# Patient Record
Sex: MALE | Race: WHITE | NOT HISPANIC OR LATINO | URBAN - METROPOLITAN AREA
[De-identification: names, ages, dates, MRNs, and addresses within clinical notes are randomized per-mention and may not be internally consistent; named-entity substitution may affect disease eponyms.]

---

## 2020-11-20 ENCOUNTER — TRANSCRIBE ORDERS (OUTPATIENT)
Dept: ADMINISTRATIVE | Facility: HOSPITAL | Age: 65
End: 2020-11-20

## 2020-11-20 ENCOUNTER — HOSPITAL ENCOUNTER (OUTPATIENT)
Dept: MRI IMAGING | Facility: HOSPITAL | Age: 65
Discharge: HOME | End: 2020-11-20
Admitting: FAMILY MEDICINE

## 2020-11-20 DIAGNOSIS — R27.8 INCOORDINATION OF EXTREMITY: ICD-10-CM

## 2020-11-20 DIAGNOSIS — R27.8 INCOORDINATION OF EXTREMITY: Primary | ICD-10-CM

## 2020-11-20 PROCEDURE — 82565 ASSAY OF CREATININE: CPT

## 2020-11-20 PROCEDURE — 70553 MRI BRAIN STEM W/O & W/DYE: CPT

## 2020-11-20 PROCEDURE — 0 GADOBENATE DIMEGLUMINE 529 MG/ML SOLUTION: Performed by: FAMILY MEDICINE

## 2020-11-20 PROCEDURE — A9577 INJ MULTIHANCE: HCPCS | Performed by: FAMILY MEDICINE

## 2020-11-20 RX ADMIN — GADOBENATE DIMEGLUMINE 20 ML: 529 INJECTION, SOLUTION INTRAVENOUS at 20:28

## 2020-11-27 LAB — CREAT BLDA-MCNC: 1 MG/DL (ref 0.6–1.3)

## 2024-11-13 ENCOUNTER — HOSPITAL ENCOUNTER (EMERGENCY)
Facility: HOSPITAL | Age: 69
Discharge: HOME | End: 2024-11-13
Attending: EMERGENCY MEDICINE | Admitting: EMERGENCY MEDICINE
Payer: MEDICARE

## 2024-11-13 ENCOUNTER — APPOINTMENT (OUTPATIENT)
Dept: MRI IMAGING | Facility: HOSPITAL | Age: 69
End: 2024-11-13
Payer: MEDICARE

## 2024-11-13 VITALS
OXYGEN SATURATION: 94 % | HEART RATE: 68 BPM | WEIGHT: 260 LBS | SYSTOLIC BLOOD PRESSURE: 155 MMHG | RESPIRATION RATE: 16 BRPM | DIASTOLIC BLOOD PRESSURE: 92 MMHG | HEIGHT: 70 IN | BODY MASS INDEX: 37.22 KG/M2 | TEMPERATURE: 97.8 F

## 2024-11-13 DIAGNOSIS — R90.89 ABNORMAL BRAIN MRI: Primary | ICD-10-CM

## 2024-11-13 DIAGNOSIS — G51.0 BELL'S PALSY: ICD-10-CM

## 2024-11-13 LAB
ALBUMIN SERPL-MCNC: 4 G/DL (ref 3.5–5.2)
ALBUMIN/GLOB SERPL: 1.4 G/DL
ALP SERPL-CCNC: 47 U/L (ref 39–117)
ALT SERPL W P-5'-P-CCNC: 16 U/L (ref 1–41)
ANION GAP SERPL CALCULATED.3IONS-SCNC: 11 MMOL/L (ref 5–15)
AST SERPL-CCNC: 24 U/L (ref 1–40)
BASOPHILS # BLD AUTO: 0.02 10*3/MM3 (ref 0–0.2)
BASOPHILS NFR BLD AUTO: 0.5 % (ref 0–1.5)
BILIRUB SERPL-MCNC: 0.5 MG/DL (ref 0–1.2)
BUN SERPL-MCNC: 9 MG/DL (ref 8–23)
BUN/CREAT SERPL: 10.3 (ref 7–25)
CALCIUM SPEC-SCNC: 9.5 MG/DL (ref 8.6–10.5)
CHLORIDE SERPL-SCNC: 101 MMOL/L (ref 98–107)
CO2 SERPL-SCNC: 24 MMOL/L (ref 22–29)
CREAT SERPL-MCNC: 0.87 MG/DL (ref 0.76–1.27)
DEPRECATED RDW RBC AUTO: 35.9 FL (ref 37–54)
EGFRCR SERPLBLD CKD-EPI 2021: 92.2 ML/MIN/1.73
EOSINOPHIL # BLD AUTO: 0.11 10*3/MM3 (ref 0–0.4)
EOSINOPHIL NFR BLD AUTO: 3 % (ref 0.3–6.2)
ERYTHROCYTE [DISTWIDTH] IN BLOOD BY AUTOMATED COUNT: 11.4 % (ref 12.3–15.4)
GLOBULIN UR ELPH-MCNC: 2.9 GM/DL
GLUCOSE SERPL-MCNC: 112 MG/DL (ref 65–99)
HCT VFR BLD AUTO: 42 % (ref 37.5–51)
HGB BLD-MCNC: 15 G/DL (ref 13–17.7)
IMM GRANULOCYTES # BLD AUTO: 0 10*3/MM3 (ref 0–0.05)
IMM GRANULOCYTES NFR BLD AUTO: 0 % (ref 0–0.5)
LYMPHOCYTES # BLD AUTO: 1.47 10*3/MM3 (ref 0.7–3.1)
LYMPHOCYTES NFR BLD AUTO: 40.1 % (ref 19.6–45.3)
MCH RBC QN AUTO: 30.7 PG (ref 26.6–33)
MCHC RBC AUTO-ENTMCNC: 35.7 G/DL (ref 31.5–35.7)
MCV RBC AUTO: 85.9 FL (ref 79–97)
MONOCYTES # BLD AUTO: 0.39 10*3/MM3 (ref 0.1–0.9)
MONOCYTES NFR BLD AUTO: 10.6 % (ref 5–12)
NEUTROPHILS NFR BLD AUTO: 1.68 10*3/MM3 (ref 1.7–7)
NEUTROPHILS NFR BLD AUTO: 45.8 % (ref 42.7–76)
NRBC BLD AUTO-RTO: 0 /100 WBC (ref 0–0.2)
PLATELET # BLD AUTO: 182 10*3/MM3 (ref 140–450)
PMV BLD AUTO: 9.6 FL (ref 6–12)
POTASSIUM SERPL-SCNC: 4 MMOL/L (ref 3.5–5.2)
PROT SERPL-MCNC: 6.9 G/DL (ref 6–8.5)
RBC # BLD AUTO: 4.89 10*6/MM3 (ref 4.14–5.8)
SODIUM SERPL-SCNC: 136 MMOL/L (ref 136–145)
WBC NRBC COR # BLD AUTO: 3.67 10*3/MM3 (ref 3.4–10.8)

## 2024-11-13 PROCEDURE — 86618 LYME DISEASE ANTIBODY: CPT | Performed by: EMERGENCY MEDICINE

## 2024-11-13 PROCEDURE — 99284 EMERGENCY DEPT VISIT MOD MDM: CPT

## 2024-11-13 PROCEDURE — 36415 COLL VENOUS BLD VENIPUNCTURE: CPT

## 2024-11-13 PROCEDURE — 80053 COMPREHEN METABOLIC PANEL: CPT | Performed by: EMERGENCY MEDICINE

## 2024-11-13 PROCEDURE — 85025 COMPLETE CBC W/AUTO DIFF WBC: CPT | Performed by: EMERGENCY MEDICINE

## 2024-11-13 PROCEDURE — 70551 MRI BRAIN STEM W/O DYE: CPT

## 2024-11-13 RX ORDER — ACYCLOVIR 400 MG/1
400 TABLET ORAL
Qty: 35 TABLET | Refills: 0 | Status: SHIPPED | OUTPATIENT
Start: 2024-11-13 | End: 2024-11-20

## 2024-11-13 RX ORDER — LISINOPRIL 30 MG/1
1 TABLET ORAL DAILY
COMMUNITY
Start: 2024-10-24

## 2024-11-13 RX ORDER — METHYLPREDNISOLONE 4 MG/1
TABLET ORAL
Qty: 21 TABLET | Refills: 0 | Status: SHIPPED | OUTPATIENT
Start: 2024-11-13

## 2024-11-13 RX ORDER — ATORVASTATIN CALCIUM 40 MG/1
40 TABLET, FILM COATED ORAL
COMMUNITY
Start: 2024-10-24

## 2024-11-13 NOTE — PLAN OF CARE
Received a call from Dr. Black this afternoon about a patient in the ED with abnormal MRI results concerning for possible pituitary mass. Imaging reviewed with Dr. Fajardo. He is going to need a repeat MRI of the brain with and without contrast, with pituitary protocol. I have placed this order. He can follow up as an outpatient with his new MRI with Dr. Fajardo. Case discussed with Dr. Black.     Mirela Meyers PA-C

## 2024-11-13 NOTE — ED PROVIDER NOTES
Subjective   History of Present Illness  This patient is a very nice 69-year-old gentleman who appears somewhat younger than his stated age.  He comes in today complaining of left facial numbness that started yesterday approximate 1230.  About 6:30 PM yesterday, roughly 15 hours ago, he noticed some droopiness on the left side of his face.  He tells me he had a stroke approximately 3 years ago and became concerned but decided to come in this morning for evaluation.  He tells me that approximate 1 week ago he had a viral illness and felt sick for several days.  He is also had a headache for couple of days.  He denies any history of dysrhythmia.  He is unsure of exactly what the causation was for his prior stroke 3 years ago.  He tells me takes blood pressure medication and a baby aspirin.  No Eliquis or Xarelto.  Patient is pleasant, articulate, and oriented x 4.  He tells me of some numbness over the left side of his face and has had difficulty closing his left eye securely.    Patient denies any runny nose currently tells me that his viral illness from last week is now gone.  He denies any ringing in the ears.    Past medical history  CVA, recent viral illness, hypertension    Family history  Negative for CVA      Review of Systems   Constitutional: Negative.  Negative for chills, fatigue, fever and unexpected weight change.   HENT:  Negative for dental problem, ear pain, hearing loss and sinus pressure.    Eyes:  Negative for pain and visual disturbance.   Respiratory:  Negative for chest tightness and shortness of breath.    Cardiovascular:  Negative for chest pain, palpitations and leg swelling.   Gastrointestinal:  Negative for blood in stool, diarrhea, nausea and vomiting.   Genitourinary:  Negative for difficulty urinating, dysuria, frequency, hematuria and urgency.   Musculoskeletal:  Negative for myalgias, neck pain and neck stiffness.   Neurological:  Positive for facial asymmetry and numbness. Negative  for seizures, syncope, speech difficulty, light-headedness and headaches.   Psychiatric/Behavioral:  Negative for confusion.    All other systems reviewed and are negative.      Past Medical History:   Diagnosis Date    Hyperlipidemia     Hypertension     Stroke        No Known Allergies    Past Surgical History:   Procedure Laterality Date    ANKLE SURGERY Left        History reviewed. No pertinent family history.    Social History     Socioeconomic History    Marital status:            Objective   Physical Exam  Vitals and nursing note reviewed.   Constitutional:       General: He is not in acute distress.     Appearance: He is well-developed. He is not toxic-appearing.   HENT:      Head: Normocephalic and atraumatic.      Jaw: No trismus.      Right Ear: Tympanic membrane, ear canal and external ear normal.      Left Ear: Tympanic membrane, ear canal and external ear normal.      Nose: Nose normal.      Mouth/Throat:      Mouth: Mucous membranes are moist. Mucous membranes are not dry. No oral lesions.      Dentition: No dental abscesses.      Pharynx: Oropharynx is clear. No posterior oropharyngeal erythema or uvula swelling.      Tonsils: No tonsillar exudate or tonsillar abscesses.   Eyes:      General:         Right eye: No discharge.         Left eye: No discharge.      Extraocular Movements: Extraocular movements intact.      Conjunctiva/sclera: Conjunctivae normal.      Right eye: Right conjunctiva is not injected.      Left eye: Left conjunctiva is not injected.      Pupils: Pupils are equal, round, and reactive to light.   Neck:      Vascular: No JVD.      Trachea: No tracheal tenderness.   Cardiovascular:      Rate and Rhythm: Normal rate and regular rhythm.      Heart sounds: Normal heart sounds.      No friction rub. No gallop.   Pulmonary:      Effort: Pulmonary effort is normal.      Breath sounds: Normal breath sounds. No wheezing or rales.   Chest:      Chest wall: No tenderness.    Abdominal:      General: Bowel sounds are normal. There is no distension.      Palpations: Abdomen is soft. Abdomen is not rigid. There is no mass or pulsatile mass.      Tenderness: There is no abdominal tenderness. There is no guarding or rebound. Negative signs include McBurney's sign.      Comments: No signs of acute abdomen.  No pain at McBurney's point.  No pulsatile abdominal mass.   Musculoskeletal:         General: No tenderness or deformity. Normal range of motion.      Cervical back: Normal range of motion and neck supple. No rigidity. Normal range of motion.   Lymphadenopathy:      Cervical: No cervical adenopathy.   Skin:     General: Skin is warm and dry.      Capillary Refill: Capillary refill takes less than 2 seconds.      Findings: No erythema or rash.      Comments: No diaphoresis, lesions, nevi, petechia, purpura   Neurological:      Mental Status: He is alert and oriented to person, place, and time.      Cranial Nerves: No cranial nerve deficit.      Sensory: No sensory deficit.      Motor: No tremor or abnormal muscle tone.      Comments: 5/5 strength bilaterally with flexion and extension of fingers, wrist, elbows, knees and hips as well as plantar and dorsiflexion of the foot.  Patient with positive facial numbness and will decrease sensory abilities of the left side of the face.  Positive facial droop.  Inability to close the left eye securely and patient unable to raise the left eyebrow.  Clear weakness of the entire distribution of the left seventh facial nerve   Psychiatric:         Attention and Perception: He is attentive.         Speech: Speech normal.         Behavior: Behavior normal.         Thought Content: Thought content normal.         Judgment: Judgment normal.         Procedures           ED Course  ED Course as of 11/16/24 1307   Wed Nov 13, 2024   0909 I had a long conversation with the patient at the bedside.  We discussed the differential diagnosis and medical decision  making.  I have ordered an MRI of the brain and labs including Lyme disease study given his history of hunting.  We have talked about peripheral versus central cranial nerve VII lesions.  Based on the fact that the patient cannot close his eye very well on the left side and has no movement of the left side of forehead, I do favor Bell's palsy.  Will get MRI of the brain, lab studies, and discussed the case with the stroke team. [CHANEL]   0910 The patient's symptoms started yesterday.  His last known well was yesterday mid afternoon.  He is out of any acute stroke window and is not a TNKase candidate.  I have talked to the patient about this.  He is in good spirits.  His family is coming here to be with him in the emergency department.  Final impression and plan following completion of workup. [CHANEL]   1022 Lyme study has been collected and is pending.  CBC and CMP back and essentially unremarkable.  Glucose is elevated at 112 otherwise no abnormalities on the CMP in need of intervention/correction.  CBC looks great.      I reexamined the patient personally as he was going to MRI.  He is feeling well.  I discussed the case with the patient's wife and we had a very nice conversation.  MRI is currently pending and if unremarkable as anticipated, we will get the patient discharged home on appropriate medications.  It sounds like the family's son was diagnosed with Bell's palsy and they are very well aware of this condition and the management of it, which is reassuring. [CHANEL]   1139 MRI has been completed and I have reviewed images independently I do not see any evidence of acute abnormality including but not limited to bleed.    Official radiologic read has been cut/pasted below and I have discussed with the stroke neuroteam.    Impression:     1. Limited study without restricted diffusion to indicate an acute infarct.  2. Mild to moderate chronic microvascular ischemic changes.  3. Enlargement of the pituitary gland with  central T2 hyperintensity suspicious for pituitary tumor. A follow-up MRI of the sella without and with contrast is recommended for further assessment.           Electronically Signed: Shelly Nino MD    11/13/2024 10:40 AM EST    Workstation ID: [id number]      Exam Ended: 11/13/24 10:28 EST     [CHANEL]   1139 I let the patient know that these findings were likely innocuous and unrelated to a serious etiology but that I would discussed the case with neurosurgery.  I have paged Dr. Nick Fajardo as of approximately 11 30 a.m. Eastern time and I am currently awaiting a callback. [CHANEL]   1140 At the most, I do believe the patient will likely have outpatient follow-up with neurosurgery for repeat imaging.  I have talked the patient and his wife about these findings and the plan.  Will discuss with neurosurgery and get the patient moving towards discharge shortly thereafter.  I plan to treat the patient with steroids and antivirals for the Bell's palsy.  Will add abnormal MRI to the patient's list and have him follow-up with PCP in the next week as we have already discussed.  Patient and family very appreciative for care and patient is without question or complaint will be discharged home shortly. [CHANEL]   1152 Attached to the patient about the conversation with neurosurgery and the plan for outpatient follow-up in the next 3 to 4 weeks.  Outpatient MRI to be ordered by neurosurgery per my conversation with them.  I have added these notes to the patient's discharge summary and patient will be discharged home accordingly. [CHANEL]      ED Course User Index  [CHANEL] Rico Black MD   No results found for this or any previous visit (from the past 24 hours).  Note: In addition to lab results from this visit, the labs listed above may include labs taken at another facility or during a different encounter within the last 24 hours. Please correlate lab times with ED admission and discharge times for further clarification of the services  performed during this visit.    MRI Brain Without Contrast   Final Result   Impression:      1. Limited study without restricted diffusion to indicate an acute infarct.   2. Mild to moderate chronic microvascular ischemic changes.   3. Enlargement of the pituitary gland with central T2 hyperintensity suspicious for pituitary tumor. A follow-up MRI of the sella without and with contrast is recommended for further assessment.            Electronically Signed: Shelly Nino MD     11/13/2024 10:40 AM EST     Workstation ID: [id number]      MRI Brain With & Without Contrast    (Results Pending)     Vitals:    11/13/24 0950 11/13/24 1100 11/13/24 1200 11/13/24 1228   BP:  157/95 149/86 155/92   BP Location:       Patient Position:       Pulse: 66 65 71 68   Resp:       Temp:       TempSrc:       SpO2: 94% 94% 91% 94%   Weight:       Height:         Medications - No data to display  ECG/EMG Results (last 24 hours)       ** No results found for the last 24 hours. **          No orders to display                        No data recorded                             Medical Decision Making  Given the motor weakness on the left side the face, I do favor Bell's palsy.  That being said, must consider stroke given his history and the story.  Would want to consider Lyme disease secondary to the patient's history of hunting.  I have talked to the patient about steroids and antivirals.  Will get MRI, labs and make disposition and planning thereafter.    Amount and/or Complexity of Data Reviewed  Labs: ordered. Decision-making details documented in ED Course.  Radiology: ordered. Decision-making details documented in ED Course.        Final diagnoses:   Bell's palsy   Abnormal brain MRI       ED Disposition  ED Disposition       ED Disposition   Discharge    Condition   Stable    Comment   --               Beth Hall MD  [address] [address]  [phone]    In 1 week      Saqib Fajardo,  MD  [address]  [address]  [place] [id number]  [phone]    In 3 weeks           Medication List        New Prescriptions      acyclovir 400 MG tablet  Commonly known as: ZOVIRAX  Take 1 tablet by mouth 5 (Five) Times a Day for 7 days. Take no more than 5 doses a day.     methylPREDNISolone 4 MG dose pack  Commonly known as: MEDROL  Take as directed on package instructions.               Where to Get Your Medications        These medications were sent to [id number] - [place], [place] - [address]  AT [facility] [address] [facility] - [phone] [facility] [phone] FX  [address]  [address], [facility] [address]      Phone: [phone]   acyclovir 400 MG tablet  methylPREDNISolone 4 MG dose pack            Rico Black MD  11/16/24 0426

## 2024-11-13 NOTE — DISCHARGE INSTRUCTIONS
Utilize Lacri-Lube and tape your eye shut as we discussed.    I have prescribed you both steroids and antivirals as we discussed.  Take as prescribed.    Call to make an appointment with your PCP in the next week.    As we discussed, call to make an appointment with neurosurgery, Dr. Adalid Fajardo, in the next 3 weeks.  They are placing an order for an outpatient MRI.  When you call to confirm appointment, make sure you know when and where to complete MRI.    Return to the emergency department immediately for new or change concerns.    Please review the medications you are supposed to be taking according to prior physician recommendations. I have not changed your home medications during this visit. If your discharge instructions indicate that I have changed your home medications, this is not the case, and you should disregard. If you have any questions about the medication you should be taking at home, please call your physician.

## 2024-11-14 LAB — B BURGDOR IGG+IGM SER QL IA: NEGATIVE

## 2024-11-27 ENCOUNTER — HOSPITAL ENCOUNTER (OUTPATIENT)
Dept: MRI IMAGING | Facility: HOSPITAL | Age: 69
Discharge: HOME | End: 2024-11-27
Payer: MEDICARE

## 2024-11-27 DIAGNOSIS — R90.89 ABNORMAL BRAIN MRI: ICD-10-CM

## 2024-11-27 PROCEDURE — 25510000002 GADOBENATE DIMEGLUMINE 529 MG/ML SOLUTION

## 2024-11-27 PROCEDURE — A9577 INJ MULTIHANCE: HCPCS

## 2024-11-27 PROCEDURE — 70553 MRI BRAIN STEM W/O & W/DYE: CPT

## 2024-11-27 RX ADMIN — GADOBENATE DIMEGLUMINE 19 ML: 529 INJECTION, SOLUTION INTRAVENOUS at 12:12

## 2024-12-02 ENCOUNTER — OFFICE VISIT (OUTPATIENT)
Dept: NEUROSURGERY | Facility: CLINIC | Age: 69
End: 2024-12-02
Payer: MEDICARE

## 2024-12-02 VITALS — BODY MASS INDEX: 35.02 KG/M2 | TEMPERATURE: 98.6 F | WEIGHT: 244.6 LBS | HEIGHT: 70 IN

## 2024-12-02 DIAGNOSIS — E23.6 PITUITARY MASS: Primary | ICD-10-CM

## 2024-12-02 PROCEDURE — 99203 OFFICE O/P NEW LOW 30 MIN: CPT | Performed by: NEUROLOGICAL SURGERY

## 2024-12-02 RX ORDER — TRAZODONE HYDROCHLORIDE 50 MG/1
50 TABLET, FILM COATED ORAL
COMMUNITY
Start: 2024-11-19

## 2024-12-02 RX ORDER — ASPIRIN 81 MG/1
1 TABLET ORAL DAILY
COMMUNITY

## 2024-12-02 NOTE — PROGRESS NOTES
"Subjective     Chief Complaint: Abnormal brain MRI    Patient ID: Dagoberto Fraga is a 69 y.o. male is here today for follow-up.    History of Present Illness    This is a 69-year-old man who I was consulted on while he was in the emergency department several weeks ago.  He came to the hospital for Bell's palsy and underwent some imaging, specifically of his brain.  There was a suggestion of a possible pituitary abnormality which represented a significant change in comparison to a study he had back in 2020.  At the time, he did not have any clinical signs or symptoms of apoplexy, so he was discharged home with follow-up with me scheduled.  He recently underwent a MRI of his brain, with and without contrast, pituitary protocol, and he presents today to discuss the results of the study.    He denies any headaches, nausea, vomiting, strokelike symptoms, or seizures.  He denies any nipple discharge, or changes in his ring, hat, or shoe size.  He denies heat or cold intolerance.    The following portions of the patient's history were reviewed and updated as appropriate: allergies, current medications, past family history, past medical history, past social history, past surgical history and problem list.    Family history: History reviewed. No pertinent family history.    Social history:   Social History     Socioeconomic History    Marital status:    Tobacco Use    Smoking status: Never    Smokeless tobacco: Never   Vaping Use    Vaping status: Never Used   Substance and Sexual Activity    Alcohol use: Not Currently    Drug use: Never    Sexual activity: Defer       Review of Systems    Objective   Temperature 98.6 °F (37 °C), temperature source Temporal, height 177.8 cm (70\"), weight 111 kg (244 lb 9.6 oz).  Body mass index is 35.1 kg/m².    Physical Exam  Vitals reviewed.   Constitutional:       General: He is not in acute distress.     Appearance: He is well-developed. He is not diaphoretic.   HENT:      Head: " Normocephalic and atraumatic.   Pulmonary:      Effort: Pulmonary effort is normal.   Skin:     General: Skin is warm and dry.   Neurological:      Mental Status: He is alert and oriented to person, place, and time.      Comments: Speech production is fluent with no paraphasic errors.  He is pleasant, conversant, and appropriate.  He is a good historian.  Visual fields are full to finger waving.  No pronator drift.  Finger-nose testing is performed without bradykinesia or dysmetria.   Psychiatric:         Behavior: Behavior normal.         Assessment & Plan     Independent Review of Radiographic Studies:      Available for my review is a MRI of the brain with and without contrast that was performed on 11/27/2024.  There is what appears to be a small focus of hemorrhage in the pituitary gland which was not present on his scan from 2020.  There is no enhancement of the pituitary gland noted.    Medical Decision Making:      The aforementioned findings are concerning for pituitary apoplexy and an underlying adenoma cannot be excluded.  My recommendation would be for repeat MRI of the brain with and without contrast, pituitary protocol in 3 months.  I reviewed the signs and symptoms of optic chiasm compression and pituitary apoplexy with the patient and his wife.  I will order visual field baselines, as well as pituitary labs.  I will follow-up with him after his next MRI.    Diagnoses and all orders for this visit:    1. Pituitary mass (Primary)        Return in about 3 months (around 3/2/2025).           This document signed by MARISABEL Fajardo MD December 2, 2024 14:49 EST

## 2025-01-07 ENCOUNTER — LAB (OUTPATIENT)
Dept: LAB | Facility: HOSPITAL | Age: 70
End: 2025-01-07
Payer: MEDICARE

## 2025-01-07 DIAGNOSIS — E23.6 PITUITARY MASS: ICD-10-CM

## 2025-01-07 LAB
FSH SERPL-ACNC: 4.45 MIU/ML
LH SERPL-ACNC: 3.64 MIU/ML
PROLACTIN SERPL-MCNC: 6.76 NG/ML (ref 4.04–15.2)
T4 FREE SERPL-MCNC: 0.79 NG/DL (ref 0.92–1.68)
TSH SERPL DL<=0.05 MIU/L-ACNC: 3.86 UIU/ML (ref 0.27–4.2)

## 2025-01-07 PROCEDURE — 84443 ASSAY THYROID STIM HORMONE: CPT

## 2025-01-07 PROCEDURE — 84146 ASSAY OF PROLACTIN: CPT

## 2025-01-07 PROCEDURE — 84305 ASSAY OF SOMATOMEDIN: CPT

## 2025-01-07 PROCEDURE — 82024 ASSAY OF ACTH: CPT

## 2025-01-07 PROCEDURE — 36415 COLL VENOUS BLD VENIPUNCTURE: CPT

## 2025-01-07 PROCEDURE — 83002 ASSAY OF GONADOTROPIN (LH): CPT

## 2025-01-07 PROCEDURE — 83001 ASSAY OF GONADOTROPIN (FSH): CPT

## 2025-01-07 PROCEDURE — 84439 ASSAY OF FREE THYROXINE: CPT

## 2025-01-08 LAB — ACTH PLAS-MCNC: 24.1 PG/ML (ref 7.2–63.3)

## 2025-01-10 LAB — IGF-I SERPL-MCNC: 72 NG/ML (ref 53–222)

## 2025-03-05 ENCOUNTER — HOSPITAL ENCOUNTER (OUTPATIENT)
Dept: MRI IMAGING | Facility: HOSPITAL | Age: 70
Discharge: HOME | End: 2025-03-05
Admitting: NEUROLOGICAL SURGERY
Payer: MEDICARE

## 2025-03-05 DIAGNOSIS — E23.6 PITUITARY MASS: ICD-10-CM

## 2025-03-05 PROCEDURE — A9577 INJ MULTIHANCE: HCPCS | Performed by: NEUROLOGICAL SURGERY

## 2025-03-05 PROCEDURE — 25510000002 GADOBENATE DIMEGLUMINE 529 MG/ML SOLUTION: Performed by: NEUROLOGICAL SURGERY

## 2025-03-05 PROCEDURE — 70553 MRI BRAIN STEM W/O & W/DYE: CPT

## 2025-03-05 RX ADMIN — GADOBENATE DIMEGLUMINE 20 ML: 529 INJECTION, SOLUTION INTRAVENOUS at 10:44

## 2025-03-07 ENCOUNTER — OFFICE VISIT (OUTPATIENT)
Dept: NEUROSURGERY | Facility: CLINIC | Age: 70
End: 2025-03-07
Payer: MEDICARE

## 2025-03-07 VITALS — TEMPERATURE: 98.2 F | BODY MASS INDEX: 34.22 KG/M2 | HEIGHT: 70 IN | WEIGHT: 239 LBS

## 2025-03-07 DIAGNOSIS — E23.0 PANHYPOPITUITARISM: Primary | ICD-10-CM

## 2025-03-07 DIAGNOSIS — E23.6 PITUITARY APOPLEXY: ICD-10-CM

## 2025-03-07 PROCEDURE — 99213 OFFICE O/P EST LOW 20 MIN: CPT | Performed by: NEUROLOGICAL SURGERY

## 2025-03-07 NOTE — PROGRESS NOTES
Subjective     Chief Complaint: Pituitary apoplexy    Patient ID: Dagoberto Fraga is a 69 y.o. male is here today for follow-up.    History of Present Illness    This is a 69-year-old man who I saw about 3 months ago for presumed pituitary apoplexy.  He was actually relatively asymptomatic at that time with no visual changes.  I ordered a surveillance/follow-up MRI and he presents today to discuss the results of this study.    He denies any additional headaches or visual changes.  He does endorse intermittent hot flashes which is a relatively new problem for him.    The following portions of the patient's history were reviewed and updated as appropriate: allergies, current medications, past family history, past medical history, past social history, past surgical history and problem list.    Family history: History reviewed. No pertinent family history.    Social history:   Social History     Socioeconomic History    Marital status:    Tobacco Use    Smoking status: Former     Current packs/day: 0.00     Average packs/day: 0.5 packs/day for 31.4 years (15.7 ttl pk-yrs)     Types: Cigarettes     Start date: 1/15/1970     Quit date: 2001     Years since quittin.8     Passive exposure: Past    Smokeless tobacco: Never   Vaping Use    Vaping status: Never Used   Substance and Sexual Activity    Alcohol use: Not Currently    Drug use: Never    Sexual activity: Not Currently     Partners: Female     Birth control/protection: Other       Review of Systems   Constitutional:  Negative for activity change, appetite change, chills, diaphoresis, fatigue, fever and unexpected weight change.   HENT:  Negative for congestion, dental problem, drooling, ear discharge, ear pain, facial swelling, hearing loss, mouth sores, nosebleeds, postnasal drip, rhinorrhea, sinus pressure, sinus pain, sneezing, sore throat, tinnitus, trouble swallowing and voice change.    Eyes:  Negative for photophobia, pain, discharge,  "redness, itching and visual disturbance.   Respiratory:  Negative for apnea, cough, choking, chest tightness, shortness of breath, wheezing and stridor.    Cardiovascular:  Negative for chest pain, palpitations and leg swelling.   Gastrointestinal:  Negative for abdominal distention, abdominal pain, anal bleeding, blood in stool, constipation, diarrhea, nausea, rectal pain and vomiting.   Endocrine: Negative for cold intolerance, heat intolerance, polydipsia, polyphagia and polyuria.   Genitourinary:  Negative for decreased urine volume, difficulty urinating, dysuria, enuresis, flank pain, frequency, genital sores, hematuria and urgency.   Musculoskeletal:  Negative for arthralgias, back pain, gait problem, joint swelling, myalgias, neck pain and neck stiffness.   Skin:  Negative for color change, pallor, rash and wound.   Allergic/Immunologic: Negative for environmental allergies, food allergies and immunocompromised state.   Neurological:  Negative for dizziness, tremors, seizures, syncope, facial asymmetry, speech difficulty, weakness, light-headedness, numbness and headaches.   Hematological:  Negative for adenopathy. Does not bruise/bleed easily.   Psychiatric/Behavioral:  Negative for agitation, behavioral problems, confusion, decreased concentration, dysphoric mood, hallucinations, self-injury, sleep disturbance and suicidal ideas. The patient is not nervous/anxious and is not hyperactive.        Objective   Temperature 98.2 °F (36.8 °C), temperature source Temporal, height 177.8 cm (70\"), weight 108 kg (239 lb).  Body mass index is 34.29 kg/m².    Physical Exam  Vitals reviewed.   Constitutional:       General: He is not in acute distress.     Appearance: He is well-developed. He is not diaphoretic.   HENT:      Head: Normocephalic and atraumatic.   Pulmonary:      Effort: Pulmonary effort is normal.   Skin:     General: Skin is warm and dry.   Neurological:      Mental Status: He is alert and oriented to " person, place, and time.   Psychiatric:         Behavior: Behavior normal.         Assessment & Plan     Independent Review of Radiographic Studies:      Available for my review is a MRI of the brain with and without contrast, pituitary protocol which was performed on March 5, 2025.  A comparison study from 11/27/2024 is also available for my review.  The previously visualized hemorrhage within the pituitary gland is resolved.  There is a thin rim of nonenhancing soft tissue along the floor of the sella with minimal normal enhancing pituitary gland remaining.    Medical Decision Making:      This is a 69-year-old man with resolved pituitary apoplexy.  I made a referral to endocrinology.  I reviewed the signs and symptoms of chiasmatic compression with him.  His visual fields were normal.  I am worried that he has panhypopituitary is him.  I will follow-up with a repeat MRI of the brain with and without contrast, pituitary protocol in 1 year and I will follow-up with him at that time.    Diagnoses and all orders for this visit:    1. Panhypopituitarism (Primary)    2. Pituitary apoplexy        No follow-ups on file.           This document signed by MARISABEL Fajardo MD March 7, 2025 10:43 EST

## 2025-05-12 ENCOUNTER — OFFICE VISIT (OUTPATIENT)
Age: 70
End: 2025-05-12
Payer: MEDICARE

## 2025-05-12 VITALS
HEIGHT: 70 IN | SYSTOLIC BLOOD PRESSURE: 128 MMHG | WEIGHT: 241 LBS | OXYGEN SATURATION: 98 % | DIASTOLIC BLOOD PRESSURE: 74 MMHG | BODY MASS INDEX: 34.5 KG/M2 | HEART RATE: 62 BPM

## 2025-05-12 DIAGNOSIS — E23.6 PITUITARY APOPLEXY: Primary | ICD-10-CM

## 2025-05-12 LAB
ALBUMIN SERPL-MCNC: 4.4 G/DL (ref 3.5–5.2)
ALBUMIN/GLOB SERPL: 1.8 G/DL
ALP SERPL-CCNC: 51 U/L (ref 39–117)
ALT SERPL W P-5'-P-CCNC: 19 U/L (ref 1–41)
ANION GAP SERPL CALCULATED.3IONS-SCNC: 11.8 MMOL/L (ref 5–15)
AST SERPL-CCNC: 22 U/L (ref 1–40)
BILIRUB SERPL-MCNC: 0.5 MG/DL (ref 0–1.2)
BUN SERPL-MCNC: 14 MG/DL (ref 8–23)
BUN/CREAT SERPL: 12.5 (ref 7–25)
CALCIUM SPEC-SCNC: 9.5 MG/DL (ref 8.6–10.5)
CHLORIDE SERPL-SCNC: 101 MMOL/L (ref 98–107)
CO2 SERPL-SCNC: 27.2 MMOL/L (ref 22–29)
CORTIS SERPL-MCNC: 9.13 MCG/DL
CREAT SERPL-MCNC: 1.12 MG/DL (ref 0.76–1.27)
EGFRCR SERPLBLD CKD-EPI 2021: 70.2 ML/MIN/1.73
GLOBULIN UR ELPH-MCNC: 2.5 GM/DL
GLUCOSE SERPL-MCNC: 94 MG/DL (ref 65–99)
POTASSIUM SERPL-SCNC: 4.4 MMOL/L (ref 3.5–5.2)
PROT SERPL-MCNC: 6.9 G/DL (ref 6–8.5)
SODIUM SERPL-SCNC: 140 MMOL/L (ref 136–145)
T4 FREE SERPL-MCNC: 0.77 NG/DL (ref 0.92–1.68)
TESTOST SERPL-MCNC: 110 NG/DL (ref 193–740)

## 2025-05-12 PROCEDURE — 80053 COMPREHEN METABOLIC PANEL: CPT | Performed by: INTERNAL MEDICINE

## 2025-05-12 PROCEDURE — 1160F RVW MEDS BY RX/DR IN RCRD: CPT | Performed by: INTERNAL MEDICINE

## 2025-05-12 PROCEDURE — 82533 TOTAL CORTISOL: CPT | Performed by: INTERNAL MEDICINE

## 2025-05-12 PROCEDURE — 36415 COLL VENOUS BLD VENIPUNCTURE: CPT | Performed by: INTERNAL MEDICINE

## 2025-05-12 PROCEDURE — 1159F MED LIST DOCD IN RCRD: CPT | Performed by: INTERNAL MEDICINE

## 2025-05-12 PROCEDURE — 84403 ASSAY OF TOTAL TESTOSTERONE: CPT | Performed by: INTERNAL MEDICINE

## 2025-05-12 PROCEDURE — 99204 OFFICE O/P NEW MOD 45 MIN: CPT | Performed by: INTERNAL MEDICINE

## 2025-05-12 PROCEDURE — 84439 ASSAY OF FREE THYROXINE: CPT | Performed by: INTERNAL MEDICINE

## 2025-05-12 NOTE — PROGRESS NOTES
"     Office Note      Date: 2025  Patient Name: Dagoberto Fraga  MRN: [id number]  : 1955    Chief Complaint   Patient presents with    Panhypopituitarism       History of Present Illness:   Dagoberto Fraga is a 69 y.o. male who presents for Panhypopituitarism  .   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 Follow up mri later that month showed hemorrhage of the tumor.  Mri in 2025, showed resolution of the bleeding and a small rim of pituitary pushed against the wall of the sella    Labs showed  low free t4. Normal prolactin.     He generally feels well   Notes no PD/PU  Subjective          Review of Systems:   Review of Systems   Constitutional:  Negative for appetite change and unexpected weight change.   Endocrine: Positive for heat intolerance. Negative for cold intolerance, polydipsia and polyuria.       The following portions of the patient's history were reviewed and updated as appropriate: allergies, current medications, past family history, past medical history, past social history, past surgical history, and problem list.    Objective     Visit Vitals  /74 (BP Location: Right arm, Patient Position: Sitting, Cuff Size: Adult)   Pulse 62   Ht 177.8 cm (70\")   Wt 109 kg (241 lb)   SpO2 98%   BMI 34.58 kg/m²           Physical Exam:  Physical Exam  Vitals reviewed.   Constitutional:       General: He is not in acute distress.     Appearance: He is normal weight. He is not ill-appearing, toxic-appearing or diaphoretic.   HENT:      Head: Normocephalic and atraumatic.   Cardiovascular:      Rate and Rhythm: Normal rate.   Pulmonary:      Effort: Pulmonary effort is normal.   Lymphadenopathy:      Cervical: No cervical adenopathy.   Neurological:      Mental Status: He is alert.   Psychiatric:         Mood and Affect: Mood normal.         Thought Content: Thought content normal.         Judgment: Judgment normal. "         Assessment / Plan      Assessment & Plan:  Problem List Items Addressed This Visit       Pituitary apoplexy - Primary    Current Assessment & Plan   High serial mr's document the story.  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

## 2025-05-12 NOTE — ASSESSMENT & PLAN NOTE
High serial mr's document the story.  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